# Patient Record
Sex: FEMALE | Race: WHITE | NOT HISPANIC OR LATINO | URBAN - METROPOLITAN AREA
[De-identification: names, ages, dates, MRNs, and addresses within clinical notes are randomized per-mention and may not be internally consistent; named-entity substitution may affect disease eponyms.]

---

## 2023-03-15 ENCOUNTER — EMERGENCY (EMERGENCY)
Facility: HOSPITAL | Age: 63
LOS: 1 days | Discharge: HOME | End: 2023-03-15
Payer: COMMERCIAL

## 2023-03-15 VITALS
RESPIRATION RATE: 18 BRPM | SYSTOLIC BLOOD PRESSURE: 115 MMHG | HEART RATE: 72 BPM | DIASTOLIC BLOOD PRESSURE: 76 MMHG | TEMPERATURE: 98 F | OXYGEN SATURATION: 96 %

## 2023-03-15 VITALS
RESPIRATION RATE: 18 BRPM | TEMPERATURE: 98 F | HEART RATE: 89 BPM | WEIGHT: 182.98 LBS | HEIGHT: 66 IN | OXYGEN SATURATION: 96 % | SYSTOLIC BLOOD PRESSURE: 92 MMHG | DIASTOLIC BLOOD PRESSURE: 62 MMHG

## 2023-03-15 LAB
ALBUMIN SERPL ELPH-MCNC: 4 G/DL — SIGNIFICANT CHANGE UP (ref 3.3–5)
ALP SERPL-CCNC: 86 U/L — SIGNIFICANT CHANGE UP (ref 40–120)
ALT FLD-CCNC: 12 U/L — SIGNIFICANT CHANGE UP (ref 10–45)
ANION GAP SERPL CALC-SCNC: 13 MMOL/L — SIGNIFICANT CHANGE UP (ref 5–17)
APPEARANCE UR: CLEAR — SIGNIFICANT CHANGE UP
AST SERPL-CCNC: 17 U/L — SIGNIFICANT CHANGE UP (ref 10–40)
BACTERIA # UR AUTO: NEGATIVE — SIGNIFICANT CHANGE UP
BASE EXCESS BLDV CALC-SCNC: 1.7 MMOL/L — SIGNIFICANT CHANGE UP (ref -2–3)
BASOPHILS # BLD AUTO: 0.05 K/UL — SIGNIFICANT CHANGE UP (ref 0–0.2)
BASOPHILS NFR BLD AUTO: 0.6 % — SIGNIFICANT CHANGE UP (ref 0–2)
BILIRUB SERPL-MCNC: 0.7 MG/DL — SIGNIFICANT CHANGE UP (ref 0.2–1.2)
BILIRUB UR-MCNC: NEGATIVE — SIGNIFICANT CHANGE UP
BUN SERPL-MCNC: 16 MG/DL — SIGNIFICANT CHANGE UP (ref 7–23)
CA-I SERPL-SCNC: 1.13 MMOL/L — LOW (ref 1.15–1.33)
CALCIUM SERPL-MCNC: 8.8 MG/DL — SIGNIFICANT CHANGE UP (ref 8.4–10.5)
CHLORIDE BLDV-SCNC: 103 MMOL/L — SIGNIFICANT CHANGE UP (ref 96–108)
CHLORIDE SERPL-SCNC: 105 MMOL/L — SIGNIFICANT CHANGE UP (ref 96–108)
CO2 BLDV-SCNC: 30 MMOL/L — HIGH (ref 22–26)
CO2 SERPL-SCNC: 25 MMOL/L — SIGNIFICANT CHANGE UP (ref 22–31)
COLOR SPEC: YELLOW — SIGNIFICANT CHANGE UP
CREAT SERPL-MCNC: 0.88 MG/DL — SIGNIFICANT CHANGE UP (ref 0.5–1.3)
DIFF PNL FLD: NEGATIVE — SIGNIFICANT CHANGE UP
EGFR: 75 ML/MIN/1.73M2 — SIGNIFICANT CHANGE UP
EOSINOPHIL # BLD AUTO: 0.12 K/UL — SIGNIFICANT CHANGE UP (ref 0–0.5)
EOSINOPHIL NFR BLD AUTO: 1.5 % — SIGNIFICANT CHANGE UP (ref 0–6)
EPI CELLS # UR: 3 /HPF — SIGNIFICANT CHANGE UP
GAS PNL BLDV: 139 MMOL/L — SIGNIFICANT CHANGE UP (ref 136–145)
GAS PNL BLDV: SIGNIFICANT CHANGE UP
GAS PNL BLDV: SIGNIFICANT CHANGE UP
GLUCOSE BLDV-MCNC: 119 MG/DL — HIGH (ref 70–99)
GLUCOSE SERPL-MCNC: 119 MG/DL — HIGH (ref 70–99)
GLUCOSE UR QL: NEGATIVE — SIGNIFICANT CHANGE UP
HCO3 BLDV-SCNC: 28 MMOL/L — SIGNIFICANT CHANGE UP (ref 22–29)
HCT VFR BLD CALC: 46.5 % — HIGH (ref 34.5–45)
HCT VFR BLDA CALC: 47 % — HIGH (ref 34.5–46.5)
HGB BLD CALC-MCNC: 15.5 G/DL — SIGNIFICANT CHANGE UP (ref 11.7–16.1)
HGB BLD-MCNC: 14.8 G/DL — SIGNIFICANT CHANGE UP (ref 11.5–15.5)
HMPV RNA SPEC QL NAA+PROBE: DETECTED
HOROWITZ INDEX BLDV+IHG-RTO: SIGNIFICANT CHANGE UP
HYALINE CASTS # UR AUTO: 4 /LPF — HIGH (ref 0–2)
IMM GRANULOCYTES NFR BLD AUTO: 0.3 % — SIGNIFICANT CHANGE UP (ref 0–0.9)
KETONES UR-MCNC: NEGATIVE — SIGNIFICANT CHANGE UP
LACTATE BLDV-MCNC: 2 MMOL/L — SIGNIFICANT CHANGE UP (ref 0.5–2)
LEUKOCYTE ESTERASE UR-ACNC: ABNORMAL
LYMPHOCYTES # BLD AUTO: 2.56 K/UL — SIGNIFICANT CHANGE UP (ref 1–3.3)
LYMPHOCYTES # BLD AUTO: 32.7 % — SIGNIFICANT CHANGE UP (ref 13–44)
MCHC RBC-ENTMCNC: 29.4 PG — SIGNIFICANT CHANGE UP (ref 27–34)
MCHC RBC-ENTMCNC: 31.8 GM/DL — LOW (ref 32–36)
MCV RBC AUTO: 92.4 FL — SIGNIFICANT CHANGE UP (ref 80–100)
MONOCYTES # BLD AUTO: 0.64 K/UL — SIGNIFICANT CHANGE UP (ref 0–0.9)
MONOCYTES NFR BLD AUTO: 8.2 % — SIGNIFICANT CHANGE UP (ref 2–14)
NEUTROPHILS # BLD AUTO: 4.45 K/UL — SIGNIFICANT CHANGE UP (ref 1.8–7.4)
NEUTROPHILS NFR BLD AUTO: 56.7 % — SIGNIFICANT CHANGE UP (ref 43–77)
NITRITE UR-MCNC: NEGATIVE — SIGNIFICANT CHANGE UP
NRBC # BLD: 0 /100 WBCS — SIGNIFICANT CHANGE UP (ref 0–0)
NT-PROBNP SERPL-SCNC: <36 PG/ML — SIGNIFICANT CHANGE UP (ref 0–300)
PCO2 BLDV: 50 MMHG — HIGH (ref 39–42)
PH BLDV: 7.36 — SIGNIFICANT CHANGE UP (ref 7.32–7.43)
PH UR: 6 — SIGNIFICANT CHANGE UP (ref 5–8)
PLATELET # BLD AUTO: 277 K/UL — SIGNIFICANT CHANGE UP (ref 150–400)
PO2 BLDV: 26 MMHG — SIGNIFICANT CHANGE UP (ref 25–45)
POTASSIUM BLDV-SCNC: 3.1 MMOL/L — LOW (ref 3.5–5.1)
POTASSIUM SERPL-MCNC: 3.2 MMOL/L — LOW (ref 3.5–5.3)
POTASSIUM SERPL-SCNC: 3.2 MMOL/L — LOW (ref 3.5–5.3)
PROT SERPL-MCNC: 7.2 G/DL — SIGNIFICANT CHANGE UP (ref 6–8.3)
PROT UR-MCNC: ABNORMAL
RAPID RVP RESULT: DETECTED
RBC # BLD: 5.03 M/UL — SIGNIFICANT CHANGE UP (ref 3.8–5.2)
RBC # FLD: 13.2 % — SIGNIFICANT CHANGE UP (ref 10.3–14.5)
RBC CASTS # UR COMP ASSIST: 2 /HPF — SIGNIFICANT CHANGE UP (ref 0–4)
SAO2 % BLDV: 40.7 % — LOW (ref 67–88)
SARS-COV-2 RNA SPEC QL NAA+PROBE: SIGNIFICANT CHANGE UP
SODIUM SERPL-SCNC: 143 MMOL/L — SIGNIFICANT CHANGE UP (ref 135–145)
SP GR SPEC: 1.03 — HIGH (ref 1.01–1.02)
TROPONIN T, HIGH SENSITIVITY RESULT: 9 NG/L — SIGNIFICANT CHANGE UP (ref 0–51)
UROBILINOGEN FLD QL: NEGATIVE — SIGNIFICANT CHANGE UP
WBC # BLD: 7.84 K/UL — SIGNIFICANT CHANGE UP (ref 3.8–10.5)
WBC # FLD AUTO: 7.84 K/UL — SIGNIFICANT CHANGE UP (ref 3.8–10.5)
WBC UR QL: 12 /HPF — HIGH (ref 0–5)

## 2023-03-15 PROCEDURE — 99223 1ST HOSP IP/OBS HIGH 75: CPT

## 2023-03-15 PROCEDURE — 93356 MYOCRD STRAIN IMG SPCKL TRCK: CPT

## 2023-03-15 PROCEDURE — 85025 COMPLETE CBC W/AUTO DIFF WBC: CPT

## 2023-03-15 PROCEDURE — 83880 ASSAY OF NATRIURETIC PEPTIDE: CPT

## 2023-03-15 PROCEDURE — 82435 ASSAY OF BLOOD CHLORIDE: CPT

## 2023-03-15 PROCEDURE — 71045 X-RAY EXAM CHEST 1 VIEW: CPT

## 2023-03-15 PROCEDURE — 71045 X-RAY EXAM CHEST 1 VIEW: CPT | Mod: 26

## 2023-03-15 PROCEDURE — 84295 ASSAY OF SERUM SODIUM: CPT

## 2023-03-15 PROCEDURE — 85018 HEMOGLOBIN: CPT

## 2023-03-15 PROCEDURE — 70450 CT HEAD/BRAIN W/O DYE: CPT | Mod: 26,MA

## 2023-03-15 PROCEDURE — 85014 HEMATOCRIT: CPT

## 2023-03-15 PROCEDURE — 99285 EMERGENCY DEPT VISIT HI MDM: CPT | Mod: 25

## 2023-03-15 PROCEDURE — 96361 HYDRATE IV INFUSION ADD-ON: CPT

## 2023-03-15 PROCEDURE — 82330 ASSAY OF CALCIUM: CPT

## 2023-03-15 PROCEDURE — 80053 COMPREHEN METABOLIC PANEL: CPT

## 2023-03-15 PROCEDURE — 82803 BLOOD GASES ANY COMBINATION: CPT

## 2023-03-15 PROCEDURE — 84100 ASSAY OF PHOSPHORUS: CPT

## 2023-03-15 PROCEDURE — 70450 CT HEAD/BRAIN W/O DYE: CPT | Mod: MA

## 2023-03-15 PROCEDURE — 83605 ASSAY OF LACTIC ACID: CPT

## 2023-03-15 PROCEDURE — 96360 HYDRATION IV INFUSION INIT: CPT | Mod: XU

## 2023-03-15 PROCEDURE — 93306 TTE W/DOPPLER COMPLETE: CPT | Mod: 26

## 2023-03-15 PROCEDURE — 0225U NFCT DS DNA&RNA 21 SARSCOV2: CPT

## 2023-03-15 PROCEDURE — 93005 ELECTROCARDIOGRAM TRACING: CPT

## 2023-03-15 PROCEDURE — 87086 URINE CULTURE/COLONY COUNT: CPT

## 2023-03-15 PROCEDURE — 84132 ASSAY OF SERUM POTASSIUM: CPT

## 2023-03-15 PROCEDURE — 81001 URINALYSIS AUTO W/SCOPE: CPT

## 2023-03-15 PROCEDURE — 82947 ASSAY GLUCOSE BLOOD QUANT: CPT

## 2023-03-15 PROCEDURE — 93306 TTE W/DOPPLER COMPLETE: CPT

## 2023-03-15 PROCEDURE — 84484 ASSAY OF TROPONIN QUANT: CPT

## 2023-03-15 PROCEDURE — 83735 ASSAY OF MAGNESIUM: CPT

## 2023-03-15 PROCEDURE — G0378: CPT

## 2023-03-15 RX ORDER — SODIUM CHLORIDE 9 MG/ML
1000 INJECTION INTRAMUSCULAR; INTRAVENOUS; SUBCUTANEOUS ONCE
Refills: 0 | Status: COMPLETED | OUTPATIENT
Start: 2023-03-15 | End: 2023-03-15

## 2023-03-15 RX ORDER — POTASSIUM CHLORIDE 20 MEQ
40 PACKET (EA) ORAL ONCE
Refills: 0 | Status: COMPLETED | OUTPATIENT
Start: 2023-03-15 | End: 2023-03-15

## 2023-03-15 RX ADMIN — Medication 40 MILLIEQUIVALENT(S): at 07:04

## 2023-03-15 RX ADMIN — SODIUM CHLORIDE 1000 MILLILITER(S): 9 INJECTION INTRAMUSCULAR; INTRAVENOUS; SUBCUTANEOUS at 06:17

## 2023-03-15 RX ADMIN — SODIUM CHLORIDE 1000 MILLILITER(S): 9 INJECTION INTRAMUSCULAR; INTRAVENOUS; SUBCUTANEOUS at 09:15

## 2023-03-15 NOTE — ED PROVIDER NOTE - CLINICAL SUMMARY MEDICAL DECISION MAKING FREE TEXT BOX
Pamela GUTIERREZ PGY-3: 62-year-old female with no past medical history, current smoker brought in by EMS for 3 syncopal/non-responsive episodes. Concern for possible cardiogenic syncope vs seizure vs URI. Will obtain cardiac workup, place on cardiac monitor. wIll check cbc for anemia, cmp for electrolyte imablance. Anticipate admission to tele. Pamela GUTIERREZ PGY-3: 62-year-old female with no past medical history, current smoker brought in by EMS for 3 syncopal/non-responsive episodes. Vital signs stable.  No pulse deficits or numbness or weakness in the upper or lower extremities to suggest aortic dissection. Concern for possible cardiogenic syncope vs seizure vs URI. Will obtain cardiac workup, place on cardiac monitor. Will check cbc for anemia, cmp for electrolyte imablance. Anticipate admission to tele.

## 2023-03-15 NOTE — ED ADULT NURSE NOTE - OBJECTIVE STATEMENT
62y female w/ no known medical history presents to Ed s/p syncopal event.  states pt got up to go to the bathroom and he went back to sleep.  states he woke up and found pt sitting on floor next to the bed.  stated he helped pt back into bed where she became unresponsive with her eyes open and he then called 911. As per pt, she remembers feeling extreme dizziness and then slipped off the edge of the bed, next remembering her  yelling her name. As per ems, pt was 90s/50s; EMS states they administered 250cc of fluid and BP came up to 106/69. Pt denies chest pain, numbness and tingling, SOB, abdominal pain, n/v/d. Pt has not ambulated in the ED.

## 2023-03-15 NOTE — ED CDU PROVIDER INITIAL DAY NOTE - CLINICAL SUMMARY MEDICAL DECISION MAKING FREE TEXT BOX
Impression and plan: History and physical most consistent with syncopal episode this morning of unclear etiology.  Described events are inconsistent with TIA and/or CVA; the associated hypotension, clammy skin and loss of consciousness are more consistent with syncope.    Differential diagnosis at this time includes symptomatic anemia, cardiac arrhythmia, occult infection, electrolyte abnormality.    ED work-up is unremarkable, plan for CDU w/u with echo telemetry cardiology consult.   She states that she has never had a stress test.

## 2023-03-15 NOTE — ED CDU PROVIDER DISPOSITION NOTE - CLINICAL COURSE
· HPI Objective Statement: 62-year-old female with no past medical history, current smoker brought in by EMS for syncope x 3 episodes at home, 2 episodes witnessed by . pt states that she was home had woke up early, drank a cup of coffee, then went back to lay down and that's last she remembers.  at bedside to provide collateral information.    states that he found wife sitting by the side of the bed, was found to be short of breath and not answering questions.  Does not recall syncopizing.  helped patient back up on the bed, was able to converse with patient until she became nonresponsive to Questions once again.  Eyes were open, patient was found to be gasping for air again, no witnessed seizure-like activity.   states that this lasted for 1 to a couple of minutes.  had a third episode while sitting on the bed.  Per EMS, fingerstick 120s, patient was initially with soft BPs with systolic in the 90s.  patient now back to baseline per her . patient has been having 2-3 days of URI symptoms, started with sore throat, then congestion and cough. hasn't been eating as much because of URI symptoms, and has been mostly resting. drinks some water.   	 patient currently denying any headache, neck pain, chest pain, abdominal pain, nausea, vomiting, fevers or chills.   Current smoker,   Endorses recent cough. no numbness/weakness in legs or arms.  PCP Dr. Steven Goldberg but hasn't seen him in >4 years  in ED, labs only significant for low potassium (repleted) and human meta-pneumovirus on RVP. pt sent to cdu for tele and echo.   in cdu, pt did well, no events on tele, echo normal, pt to f/up outpatient

## 2023-03-15 NOTE — ED CDU PROVIDER INITIAL DAY NOTE - OBJECTIVE STATEMENT
62-year-old female with no past medical history, current smoker brought in by EMS for syncope x 3 episodes at home, 2 episodes witnessed by . pt states that she was home had woke up early, drank a cup of coffee, then went back to lay down and that's last she remembers.  at bedside to provide collateral information.    states that he found wife sitting by the side of the bed, was found to be short of breath and not answering questions.  Does not recall syncopizing.  helped patient back up on the bed, was able to converse with patient until she became nonresponsive to Questions once again.  Eyes were open, patient was found to be gasping for air again, no witnessed seizure-like activity.   states that this lasted for 1 to a couple of minutes.  had a third episode while sitting on the bed.  Per EMS, fingerstick 120s, patient was initially with soft BPs with systolic in the 90s.  patient now back to baseline per her . patient has been having 2-3 days of URI symptoms, started with sore throat, then congestion and cough. hasn't been eating as much because of URI symptoms, and has been mostly resting. drinks some water.    patient currently denying any headache, neck pain, chest pain, abdominal pain, nausea, vomiting, fevers or chills.   Current smoker,   Endorses recent cough. no numbness/weakness in legs or arms.  PCP Dr. Steven Goldberg but hasn't seen him in >4 years

## 2023-03-15 NOTE — ED CDU PROVIDER DISPOSITION NOTE - NSFOLLOWUPINSTRUCTIONS_ED_ALL_ED_FT
1. Stay hydrated.  2. You can take Tessalon-perles 1 capsule every 8hrs as needed for cough.   3. Follow up with your Internist/Cardiologist Dr. Goldberg within 2-3 days.  (Bring printed results to your doctor visit).  4. Return if symptoms, worsen, fever, weakness, chest pain, difficulty breathing, dizziness and all other concerns.    your potassium was low here, increase in your diet  you have small amount of protein in your urine please follow up  sending urine culture, if positive will call you  Syncope    WHAT YOU NEED TO KNOW:    Syncope is also called fainting or passing out. Syncope is a sudden, temporary loss of consciousness, followed by a fall from a standing or sitting position. Syncope ranges from not serious to a sign of a more serious condition that needs to be treated. You can control some health conditions that cause syncope. Your healthcare providers can help you create a plan to manage syncope and prevent episodes.    DISCHARGE INSTRUCTIONS:    Seek care immediately if:   •You are bleeding because you hit your head when you fainted.       •You suddenly have double vision, difficulty speaking, numbness, and cannot move your arms or legs.      •You have chest pain and trouble breathing.      •You vomit blood or material that looks like coffee grounds.      •You see blood in your bowel movement.      Contact your healthcare provider if:   •You have new or worsening symptoms.      •You have another syncope episode.      •You have a headache, fast heartbeat, or feel too dizzy to stand up.      •You have questions or concerns about your condition or care.      Medicines:   •Medicines may be needed to help your heart pump strongly and regularly. Your healthcare provider may also make changes to any medicines that are causing syncope.       •Take your medicine as directed. Contact your healthcare provider if you think your medicine is not helping or if you have side effects. Tell your provider if you are allergic to any medicine. Keep a list of the medicines, vitamins, and herbs you take. Include the amounts, and when and why you take them. Bring the list or the pill bottles to follow-up visits. Carry your medicine list with you in case of an emergency.      Follow up with your doctor as directed: Write down your questions so you remember to ask them during your visits.    Manage syncope:   •Keep a record of your syncope episodes. Include your symptoms and your activity before and after the episode. The record can help your healthcare provider find the cause of your syncope and help you manage episodes.      •Sit or lie down when needed. This includes when you feel dizzy, your throat is getting tight, and your vision changes. Raise your legs above the level of your heart.      •Take slow, deep breaths if you start to breathe faster with anxiety or fear. This can help decrease dizziness and the feeling that you might faint.       •Check your blood pressure often. This is important if you take medicine to lower your blood pressure. Check your blood pressure when you are lying down and when you are standing. Ask how often to check during the day. Keep a record of your blood pressure numbers. Your healthcare provider may use the record to help plan your treatment.  How to take a Blood Pressure           Prevent a syncope episode:   •Move slowly and let yourself get used to one position before you move to another position. This is very important when you change from a lying or sitting position to a standing position. Take some deep breaths before you stand up from a lying position. Stand up slowly. Sudden movements may cause a fainting spell. Sit on the side of the bed or couch for a few minutes before you stand up. If you are on bedrest, try to be upright for about 2 hours each day, or as directed. Do not lock your legs if you are standing for a long period of time. Move your legs and bend your knees to keep blood flowing.      •Follow your healthcare provider's recommendations. Your provider may recommend that you drink more liquids to prevent dehydration. You may also need to have more salt to keep your blood pressure from dropping too low and causing syncope. Your provider will tell you how much liquid and sodium to have each day. He or she will also tell you how much physical activity is safe for you. This will depend on what is causing your syncope.      •Watch for signs of low blood sugar. These include hunger, nervousness, sweating, and fast or fluttery heartbeats. Talk with your healthcare provider about ways to keep your blood sugar level steady.      •Do not strain if you are constipated. You may faint if you strain to have a bowel movement. Walking is the best way to get your bowels moving. Eat foods high in fiber to make it easier to have a bowel movement. Good examples are high-fiber cereals, beans, vegetables, and whole-grain breads. Prune juice may help make bowel movements softer.      •Be careful in hot weather. Heat can cause a syncope episode. Limit activity done outside on hot days. Physical activity in hot weather can lead to dehydration. This can cause an episode.         © Copyright [employer] 2023           back to top                          © Copyright [employer] 2023

## 2023-03-15 NOTE — ED PROVIDER NOTE - OBJECTIVE STATEMENT
62-year-old female with no past medical history, current smoker brought in by EMS for 3  syncopal episodes.   at bedside to provide collateral information.    states that earlier tonight, found wife sitting by the side of the bed, was found to be short of breath and not answering questions.  Patient states that she does remember slipping off of the bed, however denies hitting her head.    Does not recall syncopizing.  Denies any chest pain at the time.    helped patient back up on the bed, was able to converse with patient until she became nonresponsive to Questions once again.  Eyes were open, patient was found to be gasping for air again, no witnessed seizure-like activity.   states that this lasted for 1 to a couple of minutes.  had a third episode while sitting on the bed.  Per EMS, fingerstick 120s, patient was initially with soft BPs with systolic in the 90s.  patient now back to baseline per her .  patient currently denying any headache, neck pain, chest pain, abdominal pain, nausea, vomiting, fevers or chills.   Current smoker,   Endorses recent cough. 62-year-old female with no past medical history, current smoker brought in by EMS for 3  syncopal episodes.   at bedside to provide collateral information.    states that earlier tonight, found wife sitting by the side of the bed, was found to be short of breath and not answering questions.  Patient states that she does remember slipping off of the bed, however denies hitting her head.    Does not recall syncopizing.  Denies any chest pain at the time.    helped patient back up on the bed, was able to converse with patient until she became nonresponsive to Questions once again.  Eyes were open, patient was found to be gasping for air again, no witnessed seizure-like activity.   states that this lasted for 1 to a couple of minutes.  had a third episode while sitting on the bed.  Per EMS, fingerstick 120s, patient was initially with soft BPs with systolic in the 90s.  patient now back to baseline per her .  patient currently denying any headache, neck pain, chest pain, abdominal pain, nausea, vomiting, fevers or chills.   Current smoker,   Endorses recent cough. [id number] any tearing chest pain, no numbness/weakness in legs or arms.

## 2023-03-15 NOTE — ED CDU PROVIDER DISPOSITION NOTE - ATTENDING APP SHARED VISIT CONTRIBUTION OF CARE
I, Michelle Quintero, performed a history and physical exam of the patient and discussed their management with the resident and /or advanced care provider. I reviewed the resident and /or ACP's note and agree with the documented findings and plan of care. I was present and available for all procedures.     63yo F with  no significant PMH, current smoker who presented to the ED s/p 3 syncopal episodes at ho,e 2 which were witnessed by . pt reports that she wok early, made coffee and then went back to bed.  found the ot sitting by the side of the bed not answering questions and looked slightly short of breath. pt doesn't recall.  helped pt back into bed and was conversing until she became unresponsive not answering questions again. eyes were open and pt seemed to be gasping for air but no seizure like activity, tongue biting or incontinence. this lasted for 1-several minutes. third episode while sitting on the bed. EM FS 120s initially had soft BPs in the ED with systolics in the 90. currently at baseline. pt noted several days of URI like symptoms with sore throat congestion and cough. has had decreased PO intake but drinking some water. pt placed in CDU for tele monitoring and echo. pt without any complaints at this time, no lightheaded/dizziness, n/v, cp, sob. echo within nml and no telemetry events. human metapneumovirus +. pt stable for discharge with cardiology follow up at this time.

## 2023-03-15 NOTE — ED PROVIDER NOTE - ATTENDING CONTRIBUTION TO CARE
MD Stapleton:  patient seen and evaluated with the resident.  I was present for key portions of the History & Physical, and I agree with the Impression & Plan.    Patient is a 62-year-old female, brought in by EMS for evaluation of syncope at home.  Patient and her  normally wake up around 4:00 in the morning,  got up and found his wife sitting on the floor next to the bed looking dazed, pale, diaphoretic.  She seemed unresponsive to him.  He helped her back into bed during which time she had a lucid episode and then exhibited another episode of unresponsiveness.  EMS was called, upon arrival she was found to be hypotensive 80s over 40s, mild tachycardia.  Diaphoretic.  She also exhibited a loss of consciousness when EMS arrived.    Associated symptoms: Patient went to bed feeling normal, and denies chest pain, shortness of breath, fever, chills, nausea, vomiting, diarrhea, black stools, bloody stools    Context: Patient is an active smoker, and has hyperlipidemia.  Has never had syncope before.  Has never had a stress test before  Primary medical doctor is Dr. Steven Goldberg    Vital signs: Initial heart rate high 90s, first blood pressures systolics in the 90s over 50s, currently 109/75  Gen: Adult female, clammy skin, awake alert.    Head: NC/AT.   PERRL, EOMI.  Neck: trachea midline, supple.  Resp:  No distress, CTA B.  Cardiac RRR, no RMG.    Abdomen:  soft, nondistended, nontender; no R/G.  Ext: no deformities, no edema.  Neuro:  A&Ox4 appears non focal. Skin:  Warm and dry as visualized, no rash.   Psych:  Normal affect and mood.    Impression and plan: History and physical most consistent with syncopal episode this morning of unclear etiology.  Described events are inconsistent with TIA and/or CVA; the associated hypotension, clammy skin and loss of consciousness are more consistent with syncope.    Differential diagnosis at this time includes symptomatic anemia, cardiac arrhythmia, occult infection, electrolyte abnormality.    If ED work-up is unremarkable, would have low threshold to keep patient in the CDU for echo telemetry cardiology consult.   She states that she has never had a stress test.

## 2023-03-15 NOTE — ED ADULT NURSE REASSESSMENT NOTE - NS ED NURSE REASSESS COMMENT FT1
Received report from MELVA Woo RN. Patient presents to the ED following multiple syncopal episodes. AOx4 and speaking coherently with  at bedside. Awaiting CDU evaluation. Patient without complaints of pain at present.

## 2023-03-15 NOTE — ED CDU PROVIDER DISPOSITION NOTE - PATIENT PORTAL LINK FT
You can access the FollowMyHealth Patient Portal offered by [facility] by registering at the following website: [email or web address]. By joining [facility]’s FollowMyHealth portal, you will also be able to view your health information using other applications (apps) compatible with our system.

## 2023-03-15 NOTE — ED CDU PROVIDER INITIAL DAY NOTE - ATTENDING APP SHARED VISIT CONTRIBUTION OF CARE
MD Stapleton:  I have personally performed a face to face diagnostic evaluation on this patient with the PA.  I have reviewed the ACP note and agree with the history, exam, and plan of care, except as noted.  History and Exam by me shows a 62-year-old female, brought in by EMS for evaluation of syncope at home.  Patient and her  normally wake up around 4:00 in the morning,  got up and found his wife sitting on the floor next to the bed looking dazed, pale, diaphoretic.  She seemed unresponsive to him.  He helped her back into bed during which time she had a lucid episode and then exhibited another episode of unresponsiveness.  EMS was called, upon arrival she was found to be hypotensive 80s over 40s, mild tachycardia.  Diaphoretic.  She also exhibited a loss of consciousness when EMS arrived.    Associated symptoms: Patient went to bed feeling normal, and denies chest pain, shortness of breath, fever, chills, nausea, vomiting, diarrhea, black stools, bloody stools    Context: Patient is an active smoker, and has hyperlipidemia.  Has never had syncope before.  Has never had a stress test before  Primary medical doctor is Dr. Steven Goldberg    Vital signs: Initial heart rate high 90s, first blood pressures systolics in the 90s over 50s, currently 109/75  Gen: Adult female, clammy skin, awake alert.    Head: NC/AT.   PERRL, EOMI.  Neck: trachea midline, supple.  Resp:  No distress, CTA B.  Cardiac RRR, no RMG.    Abdomen:  soft, nondistended, nontender; no R/G.  Ext: no deformities, no edema.  Neuro:  A&Ox4 appears non focal. Skin:  Warm and dry as visualized, no rash.   Psych:  Normal affect and mood.    Impression and plan: History and physical most consistent with syncopal episode this morning of unclear etiology.  Described events are inconsistent with TIA and/or CVA; the associated hypotension, clammy skin and loss of consciousness are more consistent with syncope.    Differential diagnosis at this time includes symptomatic anemia, cardiac arrhythmia, occult infection, electrolyte abnormality.    ED work-up is unremarkable, plan for CDU w/u with echo telemetry cardiology consult.   She states that she has never had a stress test.

## 2023-03-15 NOTE — ED ADULT NURSE REASSESSMENT NOTE - NS ED NURSE REASSESS COMMENT FT1
Received from [place], awake, alert and orientedx4. Breathing easy and non labored. Denies chest pain or sob. Call bell within easy reach. Safety maintained. For TTE today.

## 2023-03-15 NOTE — ED CDU PROVIDER INITIAL DAY NOTE - PROGRESS NOTE DETAILS
CDU NOTE FRANDY [place]: pt resting comfortably, feels well without complaint. NAD VSS. no events on tele.  echo- normal.   as per Dr. Michelle Quintero, pt stable for d/c home

## 2023-03-16 LAB
CULTURE RESULTS: SIGNIFICANT CHANGE UP
SPECIMEN SOURCE: SIGNIFICANT CHANGE UP